# Patient Record
Sex: MALE | Race: OTHER | NOT HISPANIC OR LATINO | URBAN - METROPOLITAN AREA
[De-identification: names, ages, dates, MRNs, and addresses within clinical notes are randomized per-mention and may not be internally consistent; named-entity substitution may affect disease eponyms.]

---

## 2017-12-01 ENCOUNTER — EMERGENCY (EMERGENCY)
Facility: HOSPITAL | Age: 27
LOS: 1 days | Discharge: HOME | End: 2017-12-01
Attending: EMERGENCY MEDICINE | Admitting: EMERGENCY MEDICINE
Payer: COMMERCIAL

## 2017-12-01 VITALS
TEMPERATURE: 98 F | DIASTOLIC BLOOD PRESSURE: 72 MMHG | OXYGEN SATURATION: 99 % | HEART RATE: 70 BPM | SYSTOLIC BLOOD PRESSURE: 112 MMHG | RESPIRATION RATE: 16 BRPM

## 2017-12-01 VITALS
TEMPERATURE: 99 F | DIASTOLIC BLOOD PRESSURE: 77 MMHG | SYSTOLIC BLOOD PRESSURE: 111 MMHG | HEART RATE: 70 BPM | RESPIRATION RATE: 18 BRPM | OXYGEN SATURATION: 99 %

## 2017-12-01 LAB
ALBUMIN SERPL ELPH-MCNC: 4.3 G/DL — SIGNIFICANT CHANGE UP (ref 3.3–5)
ALP SERPL-CCNC: 47 U/L — SIGNIFICANT CHANGE UP (ref 40–120)
ALT FLD-CCNC: 25 U/L RC — SIGNIFICANT CHANGE UP (ref 10–45)
ANION GAP SERPL CALC-SCNC: 13 MMOL/L — SIGNIFICANT CHANGE UP (ref 5–17)
APPEARANCE UR: CLEAR — SIGNIFICANT CHANGE UP
AST SERPL-CCNC: 19 U/L — SIGNIFICANT CHANGE UP (ref 10–40)
BACTERIA # UR AUTO: ABNORMAL /HPF
BILIRUB SERPL-MCNC: 0.8 MG/DL — SIGNIFICANT CHANGE UP (ref 0.2–1.2)
BILIRUB UR-MCNC: NEGATIVE — SIGNIFICANT CHANGE UP
BUN SERPL-MCNC: 12 MG/DL — SIGNIFICANT CHANGE UP (ref 7–23)
CALCIUM SERPL-MCNC: 9.1 MG/DL — SIGNIFICANT CHANGE UP (ref 8.4–10.5)
CHLORIDE SERPL-SCNC: 102 MMOL/L — SIGNIFICANT CHANGE UP (ref 96–108)
CO2 SERPL-SCNC: 25 MMOL/L — SIGNIFICANT CHANGE UP (ref 22–31)
COLOR SPEC: YELLOW — SIGNIFICANT CHANGE UP
CREAT SERPL-MCNC: 1.07 MG/DL — SIGNIFICANT CHANGE UP (ref 0.5–1.3)
DIFF PNL FLD: NEGATIVE — SIGNIFICANT CHANGE UP
GLUCOSE SERPL-MCNC: 93 MG/DL — SIGNIFICANT CHANGE UP (ref 70–99)
GLUCOSE UR QL: NEGATIVE — SIGNIFICANT CHANGE UP
HCT VFR BLD CALC: 45.7 % — SIGNIFICANT CHANGE UP (ref 39–50)
HGB BLD-MCNC: 15.6 G/DL — SIGNIFICANT CHANGE UP (ref 13–17)
KETONES UR-MCNC: ABNORMAL
LEUKOCYTE ESTERASE UR-ACNC: NEGATIVE — SIGNIFICANT CHANGE UP
MCHC RBC-ENTMCNC: 32 PG — SIGNIFICANT CHANGE UP (ref 27–34)
MCHC RBC-ENTMCNC: 34 GM/DL — SIGNIFICANT CHANGE UP (ref 32–36)
MCV RBC AUTO: 94.1 FL — SIGNIFICANT CHANGE UP (ref 80–100)
NITRITE UR-MCNC: NEGATIVE — SIGNIFICANT CHANGE UP
PH UR: 6 — SIGNIFICANT CHANGE UP (ref 5–8)
PLATELET # BLD AUTO: 223 K/UL — SIGNIFICANT CHANGE UP (ref 150–400)
POTASSIUM SERPL-MCNC: 4 MMOL/L — SIGNIFICANT CHANGE UP (ref 3.5–5.3)
POTASSIUM SERPL-SCNC: 4 MMOL/L — SIGNIFICANT CHANGE UP (ref 3.5–5.3)
PROT SERPL-MCNC: 7.6 G/DL — SIGNIFICANT CHANGE UP (ref 6–8.3)
PROT UR-MCNC: 100 MG/DL
RBC # BLD: 4.86 M/UL — SIGNIFICANT CHANGE UP (ref 4.2–5.8)
RBC # FLD: 11.4 % — SIGNIFICANT CHANGE UP (ref 10.3–14.5)
RBC CASTS # UR COMP ASSIST: SIGNIFICANT CHANGE UP /HPF (ref 0–2)
SODIUM SERPL-SCNC: 140 MMOL/L — SIGNIFICANT CHANGE UP (ref 135–145)
SP GR SPEC: 1.03 — HIGH (ref 1.01–1.02)
UROBILINOGEN FLD QL: NEGATIVE — SIGNIFICANT CHANGE UP
WBC # BLD: 8.9 K/UL — SIGNIFICANT CHANGE UP (ref 3.8–10.5)
WBC # FLD AUTO: 8.9 K/UL — SIGNIFICANT CHANGE UP (ref 3.8–10.5)
WBC UR QL: SIGNIFICANT CHANGE UP /HPF (ref 0–5)

## 2017-12-01 PROCEDURE — 81001 URINALYSIS AUTO W/SCOPE: CPT

## 2017-12-01 PROCEDURE — 85027 COMPLETE CBC AUTOMATED: CPT

## 2017-12-01 PROCEDURE — 74177 CT ABD & PELVIS W/CONTRAST: CPT | Mod: 26

## 2017-12-01 PROCEDURE — 99284 EMERGENCY DEPT VISIT MOD MDM: CPT | Mod: 25

## 2017-12-01 PROCEDURE — 74177 CT ABD & PELVIS W/CONTRAST: CPT

## 2017-12-01 PROCEDURE — 99285 EMERGENCY DEPT VISIT HI MDM: CPT

## 2017-12-01 PROCEDURE — 83690 ASSAY OF LIPASE: CPT

## 2017-12-01 PROCEDURE — 80053 COMPREHEN METABOLIC PANEL: CPT

## 2017-12-01 RX ORDER — CIPROFLOXACIN LACTATE 400MG/40ML
1 VIAL (ML) INTRAVENOUS
Qty: 20 | Refills: 0 | OUTPATIENT
Start: 2017-12-01 | End: 2017-12-11

## 2017-12-01 RX ORDER — METRONIDAZOLE 500 MG
1 TABLET ORAL
Qty: 20 | Refills: 0 | OUTPATIENT
Start: 2017-12-01 | End: 2017-12-11

## 2017-12-01 NOTE — ED ADULT NURSE NOTE - OBJECTIVE STATEMENT
26 y/o male c/o  abdominal pain  started yesterday around noon while he was working out.  Pain to RLQ.   Pt denies n/v/d, fever, chills, dysuria,  hematuria.  Abd soft, tender to RLQ.  No acute respiratory distress noted.  Skin warm and dry.  extremities mobile. 28 y/o male c/o  abdominal pain  started yesterday around noon while he was working out.  Pain to RLQ.   Pt denies n/v/d, fever, chills, dysuria,  hematuria.  Abd soft, tender to RLQ.  No acute respiratory distress noted.  Skin warm and dry.  Extremities mobile.

## 2017-12-01 NOTE — ED PROVIDER NOTE - ATTENDING CONTRIBUTION TO CARE
27 yom no pmhx no surg hx presents with RLQ pain onset yest during a workout. states initially didn't think much of it as he had been working out, but pain has become more severe over last 24 hrs. worse w movements, bending, walking. no f/c. no n/v/d. had normal bm yest. no testicular pain, no dysuria.     ROS:   constitutional - no fever, no chills  eyes - no visual changes, no redness  eent - no sore throat, no nasal congestion  cvs - no chest pain, no leg swelling  resp - no shortness of breath, no cough  gi - + abdominal pain, no vomiting, no diarrhea  gu - no dysuria, no hematuria  msk - no acute back pain, no joint swelling  skin - no rashes, no jaundice  neuro - no headache, no focal weakness  psych - no acute mental health issue     Physical Exam:   constitutional - well appearing, awake and alert, oriented x3  head - no external evidence of trauma  cvs - rrr, no murmurs, no peripheral edema  resp - breath sounds clear and equal bilat  gi - abdomen soft with mod RLQ tenderness, no rigidity, guarding or rebound, bowel sounds present, no cvat. no overlying skin changes.   msk - moving all extremities spontaneously  neuro - alert and oriented x3, no focal deficits, CNs 2-12 grossly intact  skin- no jaundice, warm and dry  psych - mood and affect wnl, no apparent risk to self or others     ? appy vs muscle strain. JONI Marinelli MD 27 yom no pmhx no surg hx presents with RLQ pain onset yest during a workout. states initially didn't think much of it as he had been working out, but pain has become more severe over last 24 hrs. worse w movements, bending, walking. no f/c. no n/v/d. had normal bm yest. no testicular pain, no dysuria.     ROS:   constitutional - no fever, no chills  eyes - no visual changes, no redness  eent - no sore throat, no nasal congestion  cvs - no chest pain, no leg swelling  resp - no shortness of breath, no cough  gi - + abdominal pain, no vomiting, no diarrhea  gu - no dysuria, no hematuria  msk - no acute back pain, no joint swelling  skin - no rashes, no jaundice  neuro - no headache, no focal weakness  psych - no acute mental health issue     Physical Exam:   constitutional - well appearing, awake and alert, oriented x3  head - no external evidence of trauma  cvs - rrr, no murmurs, no peripheral edema  resp - breath sounds clear and equal bilat  gi - abdomen soft with mod RLQ tenderness, no rigidity, guarding or rebound, bowel sounds present, no cvat. no overlying skin changes. no testicular tenderness or inguinal hernias  msk - moving all extremities spontaneously  neuro - alert and oriented x3, no focal deficits, CNs 2-12 grossly intact  skin- no jaundice, warm and dry  psych - mood and affect wnl, no apparent risk to self or others     ? appy vs muscle strain. will do ct, labs, ua. JONI Marinelli MD 27 yom no pmhx no surg hx presents with RLQ pain onset yest during a workout. states initially didn't think much of it as he had been working out, but pain has become more severe over last 24 hrs. worse w movements, bending, walking. no f/c. no n/v/d. had normal bm yest. no testicular pain, no dysuria.     ROS:   constitutional - no fever, no chills  eyes - no visual changes, no redness  eent - no sore throat, no nasal congestion  cvs - no chest pain, no leg swelling  resp - no shortness of breath, no cough  gi - + abdominal pain, no vomiting, no diarrhea  gu - no dysuria, no hematuria  msk - no acute back pain, no joint swelling  skin - no rashes, no jaundice  neuro - no headache, no focal weakness  psych - no acute mental health issue     Physical Exam:   constitutional - well appearing, awake and alert, oriented x3  head - no external evidence of trauma  cvs - rrr, no murmurs, no peripheral edema  resp - breath sounds clear and equal bilat  gi - abdomen soft with mod RLQ tenderness, no rigidity, guarding or rebound, bowel sounds present, no cvat. no overlying skin changes. no testicular tenderness or inguinal hernias  msk - moving all extremities spontaneously  neuro - alert and oriented x3, no focal deficits, CNs 2-12 grossly intact  skin- no jaundice, warm and dry  psych - mood and affect wnl, no apparent risk to self or others     ? appy vs muscle strain. will do ct, labs, ua. ct w ileocecitis distribution somewhat concerning for crohns, however pt has never had similar sxs prior. will give abx and refer to dr eyad HUMPHRIES for ongoing eval. additional verbal instructions regarding diagnosis, return precautions and follow up plan given to pt and/or family.  JONI Marinelli MD

## 2017-12-01 NOTE — ED PROVIDER NOTE - OBJECTIVE STATEMENT
Pt it is 28 yo M who presents with abdominal pain x1 days. He notes that the pain started yesterday around noon while he was working out. He went to the bathroom, and noted no change in pain. Pt it is 28 yo M who presents with abdominal pain x1 days. He notes that the pain started yesterday around noon while he was working out. He went to the bathroom, and noted no change in pain. He notes that the pain is 6/10 dull pain in the right side of his belly. He notes that the pain is worse when he takes a deep breath. He denies fevers, chills, nausea, vomiting, chest pain, shortness of breath, constipation or diarrhea.

## 2017-12-01 NOTE — ED PROVIDER NOTE - PLAN OF CARE
evaluation treatment Please follow up with Dr. Nehemiah Dunlap for colitis. Take ciprofloxacin 500mg twice a day, and metronidazole 500mg twice a day for 10 days.

## 2017-12-01 NOTE — ED PROVIDER NOTE - CARE PLAN
Principal Discharge DX:	Abdominal pain Principal Discharge DX:	Abdominal pain  Goal:	evaluation Principal Discharge DX:	Abdominal pain  Goal:	treatment  Instructions for follow-up, activity and diet:	Please follow up with Dr. Nehemiah Dunlap for colitis. Take ciprofloxacin 500mg twice a day, and metronidazole 500mg twice a day for 10 days. Principal Discharge DX:	Colitis  Goal:	treatment  Instructions for follow-up, activity and diet:	Please follow up with Dr. Nehemiah Dunlap for colitis. Take ciprofloxacin 500mg twice a day, and metronidazole 500mg twice a day for 10 days.
